# Patient Record
Sex: FEMALE | Race: OTHER
[De-identification: names, ages, dates, MRNs, and addresses within clinical notes are randomized per-mention and may not be internally consistent; named-entity substitution may affect disease eponyms.]

---

## 2017-10-29 NOTE — PCM.PREANE
Preanesthetic Assessment





- Procedure


Proposed Procedure: 





labor epidural





- Anesthesia/Transfusion/Family Hx


Anesthesia History: Prior Anesthesia Without Reaction (lasik, wisdom teeth)


Family History of Anesthesia Reaction: No





- Review of Systems


General: No Symptoms


Pulmonary: No Symptoms


Cardiovascular: No Symptoms


Gastrointestinal: No Symptoms


Neurological: Other (hx of lower back pain on right)


Other: Reports: None





- Physical Assessment


Pulse: 79


O2 Sat by Pulse Oximetry: 98


Respiratory Rate: 20


Blood Pressure: 112/73


Height: 1.57 m


Weight: 62.596 kg


ASA Class: 2


Mental Status: Alert & Oriented x3


Dentition: Reports: Normal Dentition


Thyro-Mental Finger Breadths: 3


Mouth Opening Finger Breadths: 4


Lungs: Clear to Auscultation, Normal Respiratory Effort


Cardiovascular: Regular Rate, Regular Rhythm





- Lab


Values: 





 Laboratory Last Values











WBC  6.85 K/uL (4.0-11.0)   10/29/17  12:09    


 


RBC  4.09 M/uL (4.30-5.90)  L  10/29/17  12:09    


 


Hgb  11.5 g/dL (12.0-16.0)  L  10/29/17  12:09    


 


Hct  34.6 % (36.0-46.0)  L  10/29/17  12:09    


 


MCV  84.6 fL (80.0-98.0)   10/29/17  12:09    


 


MCH  28.1 pg (27.0-32.0)   10/29/17  12:09    


 


MCHC  33.2 g/dL (31.0-37.0)   10/29/17  12:09    


 


RDW Std Deviation  41.4 fl (28.0-62.0)   10/29/17  12:09    


 


RDW Coeff of Marie  14 % (11.0-15.0)   10/29/17  12:09    


 


Plt Count  164 K/uL (150-400)   10/29/17  12:09    


 


MPV  10.10 fL (7.40-12.00)   10/29/17  12:09    


 


Nucleated RBC %  0.0 /100WBC  10/29/17  12:09    


 


Nucleated RBCs #  0 K/uL  10/29/17  12:09    


 


INR  0.94  (0.86-1.11)   10/29/17  12:09    


 


APTT  31.4 SEC (18.6-31.3)  H  10/29/17  12:09    


 


Fibrinogen  491 mg/dL (215-411)  H  10/29/17  12:09    


 


Blood Type  O POSITIVE   10/29/17  12:09    


 


Antibody Screen  NEGATIVE   10/29/17  12:09    














- Allergies


Allergies/Adverse Reactions: 


 Allergies











Allergy/AdvReac Type Severity Reaction Status Date / Time


 


No Known Allergies Allergy   Verified 01/12/15 15:05














- Blood


Blood Available: Yes


Product(s) Available: PRBC





- Anesthesia Plan


Pre-Op Medication Ordered: None





- Acknowledgements


Anesthesia Type Planned: Epidural


Pt an Appropriate Candidate for the Planned Anesthesia: Yes


Alternatives and Risks of Anesthesia Discussed w Pt/Guardian: Yes


Pt/Guardian Understands and Agrees with Anesthesia Plan: Yes





PreAnesthesia Questionnaire





- CURRENT (IN HOUSE) MEDS


Current Meds: 





 Current Medications





Butorphanol Tartrate (Stadol)  1 mg IVPUSH Q1H PRN


   PRN Reason: Pain


Carboprost Tromethamine (Hemabate Ds)  250 mcg IM ASDIRECTED PRN


   PRN Reason: Post Partum Hemorrhage


Lactated Ringer's (Ringers, Lactated)  1,000 mls @ 150 mls/hr IV ASDIRECTED PAULY


   Last Admin: 10/29/17 13:52 Dose:  999 mls/hr


Oxytocin/Sodium Chloride (Oxytocin 30 Unit/500 Ml-Ns)  30 unit in 500 mls @ 2 

mls/hr IV TITRATE PAULY; 2 MUNITS/MIN


   PRN Reason: Protocol


   Last Admin: 10/29/17 12:24 Dose:  2 munits/min, 2 mls/hr


Lidocaine HCl (Xylocaine 1%)  50 ml INJECT .ONCE PRN


   PRN Reason: Laceration repair


Methylergonovine Maleate (Methergine)  0.2 mg IM ASDIRECTED PRN


   PRN Reason: Post Partum Hemorrhage


Misoprostol (Cytotec)  200 mcg PO .ONCE PRN


   PRN Reason: Post Partum Hemorrhage


Nalbuphine HCl (Nubain)  10 mg IVPUSH Q1H PRN


   PRN Reason: Pain (severe 7-10)


Sodium Chloride (Saline Flush)  10 ml FLUSH ASDIRECTED PRN


   PRN Reason: Keep Vein Open


Sodium Chloride (Saline Flush)  2.5 ml FLUSH ASDIRECTED PRN


   PRN Reason: Keep Vein Open


Sterile Water (Sterile Water For Irrigation)  1,000 ml IRR ASDIRECTED PRN


   PRN Reason: delivery


Terbutaline Sulfate (Brethine)  0.25 mg SUBCUT ASDIRECTED PRN


   PRN Reason: Tacysystole





Discontinued Medications





Ephedrine Sulfate (Ephedrine Sulfate) Confirm Administered Dose 50 mg .ROUTE 

.STK-MED ONE


   Stop: 10/29/17 13:42


Fentanyl (Sublimaze) Confirm Administered Dose 300 mcg .ROUTE .STK-MED ONE


   Stop: 10/29/17 13:43


Ropivacaine (Naropin 0.2%) Confirm Administered Dose 100 mls @ as directed 

.ROUTE .STK-MED ONE


   Stop: 10/29/17 13:43


Ropivacaine (Naropin 0.2%) Confirm Administered Dose 20 ml .ROUTE .STK-MED ONE


   Stop: 10/29/17 13:43

## 2017-10-29 NOTE — PCM.PRNOTE
- Free Text/Narrative


Note: 





Called for a labor epidural for patient c/o labor pain. Patient identified and 

history reviewed. Discussed procedure and risks including bleeding, infection, 

nerve pain, nerve damage and unsuccessful epidural. Patient agrees. Sitting up, 

sterile betadine prep times 3 and sterile drape. 1% lidocaine SQ at L4 #25 

Touhy advanced  AI saline to approximately 5 cm. Catheter easily advanced to 

10cm. No heme no paresthesia. Test dose 3 ml 1.5% lidocaine with epinephrine 1:

200,000. negative reaction. Bolus of fentanyl 100 mcg given with 3 ml 0.2% 

ropivicaine. ropivicaine bolus x1 of 2ml. Total 5 ml bolus.Patient reports pain 

relief after 2 contractions. Level noted to be T10   Epidural drip of 0.2% 

ropivicaine and Fentanyl 2 mcg/ml started at 8 ml/hr PCEA bolus of 4 ml every 

20 minutes. Pt tolerated well pain score 7/10 to 2/10.

## 2017-10-30 NOTE — PCM.PNPP
- General Info


Date of Service: 10/30/17


Admission Dx/Problem (Free Text): 


33 yo P2 s/p  , PPD1 





Subjective Update: 


Patient seen at bedside , denies any complains . Normal lochia 





Functional Status: Reports: Pain Controlled, Tolerating Diet, Ambulating, 

Urinating





- Review of Systems


General: Reports: No Symptoms


HEENT: Reports: No Symptoms


Pulmonary: Reports: No Symptoms


Cardiovascular: Reports: No Symptoms


Gastrointestinal: Reports: No Symptoms


Genitourinary: Reports: No Symptoms


Musculoskeletal: Reports: No Symptoms


Skin: Reports: No Symptoms


Neurological: Reports: No Symptoms


Psychiatric: Reports: No Symptoms





- General Info


Date of Service: 10/30/17





- Patient Data


Vital Signs - Most Recent: 


 Last Vital Signs











Temp      


 


Pulse  79   10/29/17 14:27


 


Resp  20   10/29/17 14:27


 


BP  112/73   10/29/17 14:27


 


Pulse Ox  98   10/29/17 14:27











Weight - Most Recent: 62.596 kg


Lab Results - Last 24 Hours: 


 Laboratory Results - last 24 hr











  10/29/17 10/29/17 10/29/17 Range/Units





  12:09 12:09 12:09 


 


WBC  6.85    (4.0-11.0)  K/uL


 


RBC  4.09 L    (4.30-5.90)  M/uL


 


Hgb  11.5 L    (12.0-16.0)  g/dL


 


Hct  34.6 L    (36.0-46.0)  %


 


MCV  84.6    (80.0-98.0)  fL


 


MCH  28.1    (27.0-32.0)  pg


 


MCHC  33.2    (31.0-37.0)  g/dL


 


RDW Std Deviation  41.4    (28.0-62.0)  fl


 


RDW Coeff of Marie  14    (11.0-15.0)  %


 


Plt Count  164    (150-400)  K/uL


 


MPV  10.10    (7.40-12.00)  fL


 


Nucleated RBC %  0.0    /100WBC


 


Nucleated RBCs #  0    K/uL


 


INR    0.94  (0.86-1.11)  


 


APTT    31.4 H  (18.6-31.3)  SEC


 


Fibrinogen    491 H  (215-411)  mg/dL


 


Blood Type   O POSITIVE   


 


Antibody Screen   NEGATIVE   














  10/29/17 10/29/17 10/30/17 Range/Units





  18:56 18:56 06:16 


 


WBC  8.12    (4.0-11.0)  K/uL


 


RBC  3.81 L    (4.30-5.90)  M/uL


 


Hgb  10.7 L   10.3 L  (12.0-16.0)  g/dL


 


Hct  32.6 L   31.0 L  (36.0-46.0)  %


 


MCV  85.6    (80.0-98.0)  fL


 


MCH  28.1    (27.0-32.0)  pg


 


MCHC  32.8    (31.0-37.0)  g/dL


 


RDW Std Deviation  42.6    (28.0-62.0)  fl


 


RDW Coeff of Marie  14    (11.0-15.0)  %


 


Plt Count  138 L    (150-400)  K/uL


 


MPV  9.80    (7.40-12.00)  fL


 


Nucleated RBC %  0.0    /100WBC


 


Nucleated RBCs #  0    K/uL


 


INR   0.98   (0.86-1.11)  


 


APTT   32.7 H   (18.6-31.3)  SEC


 


Fibrinogen   454 H   (215-411)  mg/dL


 


Blood Type     


 


Antibody Screen     











Med Orders - Current: 


 Current Medications





Acetaminophen (Tylenol Extra Strength)  500 mg PO Q4H PRN


   PRN Reason: Pain


Acetaminophen (Tylenol Extra Strength)  1,000 mg PO Q4H PRN


   PRN Reason: Pain


Benzocaine/Menthol (Dermoplast Pain Relief 20%-0.5% Spray)  78 gm TOP 

ASDIRECTED PRN


   PRN Reason: Perineal Comfort Measure


Bisacodyl (Dulcolax)  10 mg RECTAL .ONCE PRN


   PRN Reason: Constipation


Carboprost Tromethamine (Hemabate Ds)  250 mcg IM ASDIRECTED PRN


   PRN Reason: Post Partum Hemorrhage


Docusate Sodium (Colace)  100 mg PO BID PRN


   PRN Reason: Constipation


Emollient Ointment (Lansinoh Hpa)  0 gm TOP ASDIRECTED PRN


   PRN Reason: Sore Nipples


Lactated Ringer's (Ringers, Lactated)  1,000 mls @ 150 mls/hr IV ASDIRECTED PAULY


   Last Admin: 10/29/17 13:52 Dose:  999 mls/hr


Oxytocin/Sodium Chloride (Oxytocin 30 Unit/500 Ml-Ns)  30 unit in 500 mls @ 2 

mls/hr IV TITRATE PAULY; 2 MUNITS/MIN


   PRN Reason: Protocol


   Last Titration: 10/29/17 18:00 Dose:  1 munits/min, 1 mls/hr


Ibuprofen (Motrin)  400 mg PO Q4H PRN


   PRN Reason: Pain


Ibuprofen (Motrin)  800 mg PO Q6H PRN


   PRN Reason: Pain


Lidocaine HCl (Xylocaine 1%)  50 ml INJECT .ONCE PRN


   PRN Reason: Laceration repair


Methylergonovine Maleate (Methergine)  0.2 mg IM ASDIRECTED PRN


   PRN Reason: Post Partum Hemorrhage


Misoprostol (Cytotec)  200 mcg PO .ONCE PRN


   PRN Reason: Post Partum Hemorrhage


Oxycodone HCl (Oxycodone)  5 mg PO Q2H PRN


   PRN Reason: Pain


Sodium Chloride (Saline Flush)  10 ml FLUSH ASDIRECTED PRN


   PRN Reason: Keep Vein Open


Sodium Chloride (Saline Flush)  2.5 ml FLUSH ASDIRECTED PRN


   PRN Reason: Keep Vein Open


Sterile Water (Sterile Water For Irrigation)  1,000 ml IRR ASDIRECTED PRN


   PRN Reason: delivery


Terbutaline Sulfate (Brethine)  0.25 mg SUBCUT ASDIRECTED PRN


   PRN Reason: Tacysystole


Witch Hazel (Tucks)  1 pad TOP ASDIRECTED PRN


   PRN Reason: comfort care





Discontinued Medications





Butorphanol Tartrate (Stadol)  1 mg IVPUSH Q1H PRN


   PRN Reason: Pain


Ephedrine Sulfate (Ephedrine Sulfate) Confirm Administered Dose 50 mg .ROUTE 

.STK-MED ONE


   Stop: 10/29/17 13:42


Fentanyl (Sublimaze) Confirm Administered Dose 300 mcg .ROUTE .STK-MED ONE


   Stop: 10/29/17 13:43


Ropivacaine (Naropin 0.2%) Confirm Administered Dose 100 mls @ as directed 

.ROUTE .STK-MED ONE


   Stop: 10/29/17 13:43


Nalbuphine HCl (Nubain)  10 mg IVPUSH Q1H PRN


   PRN Reason: Pain (severe 7-10)


Ropivacaine (Naropin 0.2%) Confirm Administered Dose 20 ml .ROUTE .STK-MED ONE


   Stop: 10/29/17 13:43











- Infant Interaction


Infant Disposition, Postpartum: [place] at Bedside


Infant Interaction: Holding Infant


Support Person: 





- Postpartum Recovery Exam


Fundal Tone: Firm


Fundal Level: At Umbilicus


Fundal Placement: Midline


Lochia Amount: Moderate


Lochia Color: Rubra/Red


Episiotomy/Laceration: Approximated


Bladder Status: Voiding


Urinary Elimination: Voided





- Exam


General: Alert


Lungs: Clear to Auscultation, Normal Respiratory Effort


Cardiovascular: Regular Rate, Regular Rhythm


GI/Abdominal Exam: Normal Bowel Sounds


Extremities: Normal Inspection


Psy/Mental Status: Alert





- Problem List & Annotations


(1) Vaginal delivery


SNOMED Code(s): 289742327


   Code(s): O80 - ENCOUNTER FOR FULL-TERM UNCOMPLICATED DELIVERY   Status: 

Acute   Current Visit: Yes   





- Problem List Review


Problem List Initiated/Reviewed/Updated: Yes





- My Orders


Last 24 Hours: 


My Active Orders





10/29/17 11:44


Patient Status [ADT] Routine 


Fetal Heart Tones [RC] CONTINUOUS 


Fetal Non Stress Test [RC] PER UNIT ROUTINE 


Notify Provider [RC] PRN 


Vaginal Exam [RC] PRN 


Vital Signs [RC] PER UNIT ROUTINE 


Carboprost Tromethamine [Hemabate DS]   250 mcg IM ASDIRECTED PRN 


Lidocaine 1% [Xylocaine 1%]   50 ml INJECT .ONCE PRN 


Methylergonovine [Methergine]   0.2 mg IM ASDIRECTED PRN 


Misoprostol [Cytotec]   200 mcg PO .ONCE PRN 


Sodium Chloride 0.9% [Saline Flush]   10 ml FLUSH ASDIRECTED PRN 


Sodium Chloride 0.9% [Saline Flush]   2.5 ml FLUSH ASDIRECTED PRN 


Water For Irrigation,Sterile [Sterile Water for Irrigation]   1,000 ml IRR 

ASDIRECTED PRN 


Peripheral IV Insertion Adult [OM.PC] Routine 


Resuscitation Status Routine 





10/29/17 11:45


Lactated Ringers [Ringers, Lactated] 1,000 ml IV ASDIRECTED 





10/29/17 11:49


Bedrest Bathroom Privileges [RC] ASDIRECTED 


Communication Order [RC] ASDIRECTED 


Communication Order [RC] ASDIRECTED 


Notify Provider [RC] PRN 


Oxygen Therapy [RC] ASDIRECTED 


Vaginal Exam [RC] PRN 


Vital Signs [RC] PER UNIT ROUTINE 


Terbutaline [Brethine]   0.25 mg SUBCUT ASDIRECTED PRN 





10/29/17 12:00


Oxytocin/0.9 % Sodium Chloride [Oxytocin 30 Unit/500 ML-NS] 30 unit in 500 ml 

IV TITRATE 


Medication Administration Instruction [OM.PC] Q3H 





10/29/17 20:14


Patient Status [ADT] Routine 


May Shower [RC] ASDIRECTED 


Up ad Avani [RC] ASDIRECTED 


Vital Signs [RC] PER UNIT ROUTINE 


Acetaminophen [Tylenol Extra Strength]   1,000 mg PO Q4H PRN 


Acetaminophen [Tylenol Extra Strength]   500 mg PO Q4H PRN 


Benzocaine/Menthol [Dermoplast Pain Relief 20%-0.5% Spray]   78 gm TOP 

ASDIRECTED PRN 


Bisacodyl [Dulcolax]   10 mg RECTAL .ONCE PRN 


Docusate Sodium [Colace]   100 mg PO BID PRN 


Ibuprofen [Motrin]   400 mg PO Q4H PRN 


Ibuprofen [Motrin]   800 mg PO Q6H PRN 


Lanolin [Lansinoh HPA]   See Dose Instructions  TOP ASDIRECTED PRN 


Witch Hazel [Tucks]   1 pad TOP ASDIRECTED PRN 


oxyCODONE   5 mg PO Q2H PRN 


Assess Lochia [WOMSER] Per Unit Routine 


Assess Uterine Involution [WOMSER] Per Unit Routine 


Peripheral IV Discontinue [OM.PC] Routine 














- Assessment


Assessment:: 





33 yo P2 s/p  , PPD1 , stable





- Plan


Plan:: 


Continue regular diet


Breastfeeding


Pain control 


Discharge home today

## 2017-10-30 NOTE — OR
SURGEON:

CARSON BAILEY

 

DATE OF PROCEDURE:  10/29/2017

 

PREOPERATIVE DIAGNOSES:

1. A 32-year-old  3, para 1-0-1-1, at 38 weeks and 6 days, admitted for

    induction of labor secondary to third trimester bleeding.

2. Group B Streptococcus is negative.

 

POSTOPERATIVE DIAGNOSES:

1. Status post spontaneous vaginal delivery.

2. Placental abruption.

3. Group B Streptococcus is negative.

 

ESTIMATED BLOOD LOSS:

300.

 

ANESTHESIA:

Epidural.

 

FINDINGS:

A live male  delivered at 1909, weight was 3,410 grams, Apgar score was 8

and 9.  Three-vessel cord was noted.  On inspection of the placenta, there was

about 5-10% abruption.  A second-degree laceration was repaired also.

 

HISTORY:

The patient is a 32-year-old, G3, P1-0-1-1 at 38 weeks and 6 days, who presented

to the triage complaining of vaginal spotting and bleeding that soaked a pad.

The patien , reported decreased  fetal movement.  As a result, the patient

was examined and found to have some mild spotting in the posterior vaginal

fornix. The Fetal heart tones was Category 1. The patient was examined and 
found to be 2 cm dilated.  As a result,

patient was kept for induction of labor, and serial coags were done.  The

patient had the Frank bulb placed.  After the Frank bulb, patient was 3 cm

dilated.  The patient was then reevaluated and found to be 5 cm dilated, 50%

effaced, and -2 station.  AROM was done, which showed bloody

fluid.  Subsequently, the patient became fully dilated.

 

PROCEDURE IN DETAIL:

When patient was fully dilated, the patient was encouraged to push.  The patient

had good pushing effort, and delivered the head, followed by the anterior

shoulder and then the posterior shoulder, followed by the body.  The infant was

then placed on the abdomen of the mother and suctioned cleaned.  The cord was

clamped x2, and the placenta was then delivered, and a gush of blood was noted

with the delivery of the placenta.  The uterus was then massaged.

The perineum was inspected and assessed to have a second-degree laceration,

which was sutured with 2-0 Caprosyn.  The perineum was then inspected again and

found to be hemostatic.The instrument and pad counts were correct x2.

 



 

 

LUIS CARLOS BALL

DD:  10/30/2017 06:20:39

DT:  10/30/2017 07:16:55

Job #:  [id number]

IJEOMA

## 2017-12-10 NOTE — EDM.PDOC
ED HPI GENERAL MEDICAL PROBLEM





- General


Chief Complaint: Abdominal Pain


Stated Complaint: ABD PAIN


Time Seen by Provider: 12/10/17 07:15


Source of Information: Reports: Patient


History Limitations: Reports: No Limitations





- History of Present Illness


INITIAL COMMENTS - FREE TEXT/NARRATIVE: 


History of present illness:


[]Patient ate a large meal last night at 7 PM and then drinks soda which she 

normally doesn't and shortly experienced right-sided abdominal pain. Pain 

radiating around her right upper flank and was cramping. She was able to sleep 

last night but awoke early to feed her  baby and the pain began again. 

She denies any vomiting but feels nauseated.





Review of systems: 


As per history of present illness and below otherwise all systems reviewed and 

negative.





Past medical history: 


As per history of present illness and as reviewed below otherwise 

noncontributory.





Surgical history: 


As per history of present illness and as reviewed below otherwise 

noncontributory.





Social history: 


No reported history of drug or alcohol abuse.





Family history: 


As per history of present illness and as reviewed below otherwise 

noncontributory.





Physical exam:


General: Well developed, well nourished in NAD


HEENT: Atraumatic, normocephalic, pupils reactive, negative for conjunctival 

pallor or scleral icterus, mucous membranes moist, throat clear, neck supple, 

nontender, trachea midline.


Lungs: Clear to auscultation, breath sounds equal bilaterally, chest nontender.


Heart: S1S2, regular, negative for clicks, rubs, or JVD.


Abdomen: Soft, nondistended, nontender. Negative for masses or 

hepatosplenomegaly. Negative for costovertebral tenderness.


Pelvis: Stable nontender.


Genitourinary: Deferred.


Rectal: Deferred.


Extremities: Atraumatic, negative for cords or calf pain. Neurovascular 

unremarkable.


Neuro: Awake, alert, oriented. Cranial nerves II through XII unremarkable. 

Cerebellum unremarkable. Motor and sensory unremarkable throughout. Exam 

nonfocal.





Diagnostics:


[]Labs normal ultrasound gallbladder shows cholelithiasis no signs of 

cholecystitis 





Therapeutics:


[]IV hydration and morphine Zofran for pain and nausea





Impression: 


[]Cholelithiasis





Plan:


[]Follow-up with general surgeon, [place] and Zofran for pain and nausea. Return 

if symptoms worsen or change.





Definitive disposition and diagnosis as appropriate pending reevaluation and 

review of above.





  ** Right Lower Abdomen


Pain Score (Numeric/FACES): 10





- Related Data


 Allergies











Allergy/AdvReac Type Severity Reaction Status Date / Time


 


No Known Allergies Allergy   Verified 12/10/17 07:11











Home Meds: 


 Home Meds





. [No Known Home Meds]  12/10/17 [History]











Past Medical History


OB/GYN History: Reports: Pregnancy, Spontaneous 





- Infectious Disease History


Infectious Disease History: Reports: Chicken Pox, Measles





- Past Surgical History


HEENT Surgical History: Reports: LASIK, Other (See Below)


Other HEENT Surgeries/Procedures: wisdom teeth extraction





Social & Family History





- Family History


Cardiac: Reports: Hypertension


Respiratory: Reports: Asthma


: Reports: Renal Calculus


Neurological: Reports: CVA


Endocrine/Metabolic: Reports: Diabetes, Type I


Oncologic: Reports: Breast





- Tobacco Use


Smoking Status *Q: Never Smoker


Second Hand Smoke Exposure: No





- Caffeine Use


Caffeine Use: Reports: Soda


Other Caffeine Use: occasional


Caffeine Use Comment: rare





- Recreational Drug Use


Recreational Drug Use: No





ED ROS GENERAL





- Review of Systems


Review Of Systems: See Below (See history of present illness)





ED EXAM, GI/ABD





- Physical Exam


Exam: See Below (See history of present illness)





Course





- Vital Signs


Last Recorded V/S: 


 Last Vital Signs











Temp  97.3 F   12/10/17 07:07


 


Pulse  74   12/10/17 08:44


 


Resp  16   12/10/17 08:44


 


BP  114/64   12/10/17 08:44


 


Pulse Ox  97   12/10/17 08:44














- Orders/Labs/Meds


Orders: 


 Active Orders 24 hr











 Category Date Time Status


 


 Abdomen Ltd [US] Stat Exams  12/10/17 08:31 Taken


 


 Sodium Chloride 0.9% [Saline Flush] Med  12/10/17 07:22 Active





 10 ml FLUSH ASDIRECTED PRN   


 


 Sodium Chloride 0.9% [Saline Flush] Med  12/10/17 07:22 Active





 2.5 ml FLUSH ASDIRECTED PRN   


 


 Saline Lock Insert [OM.PC] Stat Oth  12/10/17 07:21 Ordered








 Medication Orders





Sodium Chloride (Saline Flush)  10 ml FLUSH ASDIRECTED PRN


   PRN Reason: Keep Vein Open


Sodium Chloride (Saline Flush)  2.5 ml FLUSH ASDIRECTED PRN


   PRN Reason: Keep Vein Open








Labs: 


 Laboratory Tests











  12/10/17 12/10/17 12/10/17 Range/Units





  07:30 07:30 07:45 


 


WBC    7.85  (4.0-11.0)  K/uL


 


RBC    5.13  (4.30-5.90)  M/uL


 


Hgb    14.2  (12.0-16.0)  g/dL


 


Hct    42.0  (36.0-46.0)  %


 


MCV    81.9  (80.0-98.0)  fL


 


MCH    27.7  (27.0-32.0)  pg


 


MCHC    33.8  (31.0-37.0)  g/dL


 


RDW Std Deviation    42.0  (28.0-62.0)  fl


 


RDW Coeff of Marie    14  (11.0-15.0)  %


 


Plt Count    168  (150-400)  K/uL


 


MPV    10.00  (7.40-12.00)  fL


 


Neut % (Auto)    65.8  (48.0-80.0)  %


 


Lymph % (Auto)    27.0  (16.0-40.0)  %


 


Mono % (Auto)    6.0  (0.0-15.0)  %


 


Eos % (Auto)    1.1  (0.0-7.0)  %


 


Baso % (Auto)    0.1  (0.0-1.5)  %


 


Neut # (Auto)    5.2  (1.4-5.7)  K/uL


 


Lymph # (Auto)    2.1  (0.6-2.4)  K/uL


 


Mono # (Auto)    0.5  (0.0-0.8)  K/uL


 


Eos # (Auto)    0.1  (0.0-0.7)  K/uL


 


Baso # (Auto)    0.0  (0.0-0.1)  K/uL


 


Nucleated RBC %    0.0  /100WBC


 


Nucleated RBCs #    0  K/uL


 


Sodium     (136-146)  mmol/L


 


Potassium     (3.5-5.1)  mmol/L


 


Chloride     ()  mmol/L


 


Carbon Dioxide     (21-31)  mmol/L


 


BUN     (6.0-23.0)  mg/dL


 


Creatinine     (0.6-1.5)  mg/dL


 


Est Cr Clr Drug Dosing     mL/min


 


Estimated GFR (MDRD)     ml/min


 


Glucose     ()  mg/dL


 


Calcium     (8.8-10.8)  mg/dL


 


Total Bilirubin     (0.1-1.5)  mg/dL


 


AST     (5-40)  IU/L


 


ALT     (8-54)  IU/L


 


Alkaline Phosphatase     ()  


 


Total Protein     (6.0-8.0)  g/dL


 


Albumin     (3.5-5.0)  g/dL


 


Globulin     (2.0-3.5)  g/dL


 


Albumin/Globulin Ratio     (1.3-2.8)  


 


Lipase     (7-80)  U/L


 


Urine Color   YELLOW   


 


Urine Appearance   SLT CLOUDY   


 


Urine pH   7.0   (5.0-8.0)  


 


Ur Specific Gravity   1.015   (1.001-1.035)  


 


Urine Protein   NEGATIVE   (NEGATIVE)  mg/dL


 


Urine Glucose (UA)   NEGATIVE   (NEGATIVE)  mg/dL


 


Urine Ketones   NEGATIVE   (NEGATIVE)  mg/dL


 


Urine Occult Blood   TRACE-INTACT   (NEGATIVE)  


 


Urine Nitrite   NEGATIVE   (NEGATIVE)  


 


Urine Bilirubin   NEGATIVE   (NEGATIVE)  


 


Urine Urobilinogen   0.2   (<2.0)  EU/dL


 


Ur Leukocyte Esterase   TRACE   (NEGATIVE)  


 


Urine RBC   0-3   (0-2/HPF)  


 


Urine WBC   0-3   (0-5/HPF)  


 


Ur Epithelial Cells   OCCASIONAL   (NONE-FEW)  


 


Urine Bacteria   FEW   (NEGATIVE)  


 


Urine HCG, Qual  NEGATIVE    (NEGATIVE)  














  12/10/17 Range/Units





  07:45 


 


WBC   (4.0-11.0)  K/uL


 


RBC   (4.30-5.90)  M/uL


 


Hgb   (12.0-16.0)  g/dL


 


Hct   (36.0-46.0)  %


 


MCV   (80.0-98.0)  fL


 


MCH   (27.0-32.0)  pg


 


MCHC   (31.0-37.0)  g/dL


 


RDW Std Deviation   (28.0-62.0)  fl


 


RDW Coeff of Marie   (11.0-15.0)  %


 


Plt Count   (150-400)  K/uL


 


MPV   (7.40-12.00)  fL


 


Neut % (Auto)   (48.0-80.0)  %


 


Lymph % (Auto)   (16.0-40.0)  %


 


Mono % (Auto)   (0.0-15.0)  %


 


Eos % (Auto)   (0.0-7.0)  %


 


Baso % (Auto)   (0.0-1.5)  %


 


Neut # (Auto)   (1.4-5.7)  K/uL


 


Lymph # (Auto)   (0.6-2.4)  K/uL


 


Mono # (Auto)   (0.0-0.8)  K/uL


 


Eos # (Auto)   (0.0-0.7)  K/uL


 


Baso # (Auto)   (0.0-0.1)  K/uL


 


Nucleated RBC %   /100WBC


 


Nucleated RBCs #   K/uL


 


Sodium  137  (136-146)  mmol/L


 


Potassium  4.0  (3.5-5.1)  mmol/L


 


Chloride  104  ()  mmol/L


 


Carbon Dioxide  21  (21-31)  mmol/L


 


BUN  11  (6.0-23.0)  mg/dL


 


Creatinine  0.7  (0.6-1.5)  mg/dL


 


Est Cr Clr Drug Dosing  92.94  mL/min


 


Estimated GFR (MDRD)  > 60.0  ml/min


 


Glucose  129 H  ()  mg/dL


 


Calcium  9.9  (8.8-10.8)  mg/dL


 


Total Bilirubin  0.4  (0.1-1.5)  mg/dL


 


AST  17  (5-40)  IU/L


 


ALT  18  (8-54)  IU/L


 


Alkaline Phosphatase  103  ()  


 


Total Protein  8.3 H  (6.0-8.0)  g/dL


 


Albumin  4.6  (3.5-5.0)  g/dL


 


Globulin  3.7 H  (2.0-3.5)  g/dL


 


Albumin/Globulin Ratio  1.2 L  (1.3-2.8)  


 


Lipase  24  (7-80)  U/L


 


Urine Color   


 


Urine Appearance   


 


Urine pH   (5.0-8.0)  


 


Ur Specific Gravity   (1.001-1.035)  


 


Urine Protein   (NEGATIVE)  mg/dL


 


Urine Glucose (UA)   (NEGATIVE)  mg/dL


 


Urine Ketones   (NEGATIVE)  mg/dL


 


Urine Occult Blood   (NEGATIVE)  


 


Urine Nitrite   (NEGATIVE)  


 


Urine Bilirubin   (NEGATIVE)  


 


Urine Urobilinogen   (<2.0)  EU/dL


 


Ur Leukocyte Esterase   (NEGATIVE)  


 


Urine RBC   (0-2/HPF)  


 


Urine WBC   (0-5/HPF)  


 


Ur Epithelial Cells   (NONE-FEW)  


 


Urine Bacteria   (NEGATIVE)  


 


Urine HCG, Qual   (NEGATIVE)  











Meds: 


Medications











Generic Name Dose Route Start Last Admin





  Trade Name Freq  PRN Reason Stop Dose Admin


 


Sodium Chloride  10 ml  12/10/17 07:22  





  Saline Flush  FLUSH   





  ASDIRECTED PRN   





  Keep Vein Open   


 


Sodium Chloride  2.5 ml  12/10/17 07:22  





  Saline Flush  FLUSH   





  ASDIRECTED PRN   





  Keep Vein Open   














Discontinued Medications














Generic Name Dose Route Start Last Admin





  Trade Name Yen  PRN Reason Stop Dose Admin


 


Sodium Chloride  1,000 mls @ 999 mls/hr  12/10/17 07:22  12/10/17 07:51





  Normal Saline  IV  12/10/17 08:22  999 mls/hr





  .Bolus ONE   Administration


 


Morphine Sulfate  2 mg  12/10/17 07:22  12/10/17 07:51





  Morphine  IVPUSH  12/10/17 07:23  2 mg





  ONETIME ONE   Administration


 


Ondansetron HCl  4 mg  12/10/17 07:22  12/10/17 07:51





  Zofran  IVPUSH  12/10/17 07:23  4 mg





  ONETIME ONE   Administration














Departure





- Departure


Time of Disposition: 09:39


Disposition: Home, Self-Care 01


Condition: Good


Clinical Impression: 


Cholelithiasis


Qualifiers:


 Cholelithiasis location: gallbladder Cholecystitis presence: without 

cholecystitis Biliary obstruction: without biliary obstruction Qualified Code(s)

: K80.20 - Calculus of gallbladder without cholecystitis without obstruction








- Discharge Information


Referrals: 


Osmin Crocker [Primary Care Provider] - 


Forms:  ED Department Discharge


Additional Instructions: 


The following information is given to patients seen in the emergency department 

who are being discharged to home. This information is to outline your options 

for follow-up care. We provide all patients seen in our emergency department 

with a follow-up referral.





The need for follow-up, as well as the timing and circumstances, are variable 

depending upon the specifics of your emergency department visit.





If you don't have a primary care physician on staff, we will provide you with a 

referral. We always advise you to contact your personal physician following an 

emergency department visit to inform them of the circumstance of the visit and 

for follow-up with them and/or the need for any referrals to a consulting 

specialist.





The emergency department will also refer you to a specialist when appropriate. 

This referral assures that you have the opportunity for follow-up care with a 

specialist. All of these measure are taken in an effort to provide you with 

optimal care, which includes your follow-up.





Under all circumstances we always encourage you to contact your private 

physician who remains a resource for coordinating your care. When calling for 

follow-up care, please make the office aware that this follow-up is from your 

recent emergency room visit. If for any reason you are refused follow-up, 

please contact the [facility] Emergency 

Department at [phone] and asked to speak to the emergency department 

charge nurse.





[place], Zofran for nausea follow-up with general surgery. Return if fevers occur

, symptoms worsen or change











- My Orders


Last 24 Hours: 


My Active Orders





12/10/17 07:21


Saline Lock Insert [OM.PC] Stat 





12/10/17 07:22


Sodium Chloride 0.9% [Saline Flush]   10 ml FLUSH ASDIRECTED PRN 


Sodium Chloride 0.9% [Saline Flush]   2.5 ml FLUSH ASDIRECTED PRN 





12/10/17 08:31


Abdomen Ltd [US] Stat 














- Assessment/Plan


Last 24 Hours: 


My Active Orders





12/10/17 07:21


Saline Lock Insert [OM.PC] Stat 





12/10/17 07:22


Sodium Chloride 0.9% [Saline Flush]   10 ml FLUSH ASDIRECTED PRN 


Sodium Chloride 0.9% [Saline Flush]   2.5 ml FLUSH ASDIRECTED PRN 





12/10/17 08:31


Abdomen Ltd [US] Stat

## 2017-12-11 NOTE — US
EXAM DATE: 12/10/17



PATIENT'S AGE: 32



Patient: HERMELINDO PEREZ



Facility: [place], [place]

Patient ID: [id number]

Site Patient ID: [id number].

Site Accession #: [id number].

: 1985

Study: US Abdomen [id number]/10/2017 9:24:33 AM

Ordering Physician: Kyle Mays



Final Report: 

INDICATION:

Right upper quadrant pain.



Technique:

Abdominal limited ultrasound.



Findings:

Pancreas is more hypoechoic than typical which is likely normal variant. 
Pancreas otherwise normal. Multiple small stones in the dependent aspect of a 
moderately distended gallbladder. Gallbladder wall normal. No sonographic 
Eubanks sign. No pericholecystic fluid. Common bile duct measures 1.1 mm which 
is normal. Right kidney measures 10.0 cm and is negative for hydronephrosis. 
Liver is negative for mass or bile duct dilatation. Remainder negative.



Impression:

Cholelithiasis. Moderately distended gallbladder. No sonographic evidence of 
cholecystitis or biliary dilatation. Other findings as above.





Dictated by Chester Santillan MD @ Dec 10 2017 9:30AM

(Electronic Signature)



Report Signed by Proxy.
[email or web address]

## 2020-10-29 ENCOUNTER — HOSPITAL ENCOUNTER (INPATIENT)
Dept: HOSPITAL 56 - MW.OBCHECK | Age: 35
LOS: 1 days | Discharge: HOME | DRG: 560 | End: 2020-10-30
Attending: OBSTETRICS & GYNECOLOGY | Admitting: OBSTETRICS & GYNECOLOGY
Payer: COMMERCIAL

## 2020-10-29 DIAGNOSIS — Z3A.39: ICD-10-CM

## 2020-10-29 DIAGNOSIS — Z20.828: ICD-10-CM

## 2020-10-29 PROCEDURE — 3E0R3BZ INTRODUCTION OF ANESTHETIC AGENT INTO SPINAL CANAL, PERCUTANEOUS APPROACH: ICD-10-PCS | Performed by: OBSTETRICS & GYNECOLOGY

## 2020-10-29 PROCEDURE — U0002 COVID-19 LAB TEST NON-CDC: HCPCS

## 2020-10-29 PROCEDURE — 00HU33Z INSERTION OF INFUSION DEVICE INTO SPINAL CANAL, PERCUTANEOUS APPROACH: ICD-10-PCS | Performed by: OBSTETRICS & GYNECOLOGY

## 2020-10-29 NOTE — PCM.PREANE
Preanesthetic Assessment





- Anesthesia/Transfusion/Family Hx


Anesthesia History: Prior Anesthesia Without Reaction


Transfusion History: No Prior Transfusion(s)





- Review of Systems


General: No Symptoms


Pulmonary: Other (symptomatic from seasonal allergies)


Cardiovascular: No Symptoms


Gastrointestinal: No Symptoms


Neurological: No Symptoms


Other: Reports: None





- Physical Assessment


NPO Status Date: 10/29/20


NPO Status Time: 08:55


Height: 1.57 m


Weight: 63.503 kg


ASA Class: 2


Mental Status: Alert & Oriented x3


Airway Class: Mallampati = 2


Dentition: Reports: Normal Dentition


Thyro-Mental Finger Breadths: 3


Mouth Opening Finger Breadths: 3


ROM/Head Extension: Full


Lungs: Clear to Auscultation, Normal Respiratory Effort


Cardiovascular: Regular Rate, Regular Rhythm





- Lab


Values: 





                             Laboratory Last Values











WBC  7.82 K/uL (4.0-11.0)   10/29/20  06:25    


 


RBC  4.53 M/uL (4.30-5.90)   10/29/20  06:25    


 


Hgb  13.2 g/dL (12.0-16.0)   10/29/20  06:25    


 


Hct  39.6 % (36.0-46.0)   10/29/20  06:25    


 


MCV  87.4 fL (80.0-98.0)   10/29/20  06:25    


 


MCH  29.1 pg (27.0-32.0)   10/29/20  06:25    


 


MCHC  33.3 g/dL (31.0-37.0)   10/29/20  06:25    


 


RDW Std Deviation  66.4 fl (28.0-62.0)  H  10/29/20  06:25    


 


RDW Coeff of Marie  21 % (11.0-15.0)  H  10/29/20  06:25    


 


Plt Count  150 K/uL (150-400)   10/29/20  06:25    


 


MPV  10.30 fL (7.40-12.00)   10/29/20  06:25    


 


Nucleated RBC %  0.0 /100WBC  10/29/20  06:25    


 


Nucleated RBCs #  0 K/uL  10/29/20  06:25    


 


SARS-CoV-2 RNA (ARTEMIO)  NEGATIVE  (NEGATIVE)   10/29/20  06:35    


 


Blood Type  O POSITIVE   10/29/20  06:25    


 


Antibody Screen  NEGATIVE   10/29/20  06:25    














- Allergies


Allergies/Adverse Reactions: 


                                    Allergies











Allergy/AdvReac Type Severity Reaction Status Date / Time


 


grape Allergy  Airway Verified 10/29/20 08:04





   Tightness  


 


watermelon Allergy  Airway Verified 10/29/20 08:04





   Tightness  














- Acknowledgements


Anesthesia Type Planned: Epidural (The patient understands and accepts the 

anesthetic risks and benefits of epidural.  All questions answered.  She agrees 

to proceed. consent signed.  )


Pt an Appropriate Candidate for the Planned Anesthesia: Yes


Alternatives and Risks of Anesthesia Discussed w Pt/Guardian: Yes


Pt/Guardian Understands and Agrees with Anesthesia Plan: Yes





PreAnesthesia Questionnaire


HEENT History: Reports: Other (See Below) (seasonal allergies)


Cardiovascular History: Reports: None


Respiratory History: Reports: Other (See Below) (symptomatic from seasonal 

allergies.)


Gastrointestinal History: Reports: None


Genitourinary History: Reports: None


OB/GYN History: Reports: Pregnancy, Spontaneous 


Musculoskeletal History: Reports: None


Neurological History: Reports: None


Psychiatric History: Reports: None


Endocrine/Metabolic History: Reports: None


Hematologic History: Reports: None





- Infectious Disease History


Infectious Disease History: Reports: Chicken Pox





- Past Surgical History


HEENT Surgical History: Reports: LASIK, Other (See Below)


Other HEENT Surgeries/Procedures: wisdom teeth extraction


Cardiovascular Surgical History: Reports: None





- SUBSTANCE USE


Tobacco Use Status *Q: Never Tobacco User


Second Hand Smoke Exposure: No


Recreational Drug Use History: No





- HOME MEDS


Home Medications: 


                                    Home Meds





Pnv No.95/Ferrous Fum/Folic AC [Prenatal Tablet] 1 each PO 10/29/20 [History]











- CURRENT (IN HOUSE) MEDS


Current Meds: 





                               Current Medications





Butorphanol Tartrate (Stadol)  1 mg IVPUSH Q1H PRN


   PRN Reason: Pain


Carboprost Tromethamine (Hemabate Ds)  250 mcg IM ASDIRECTED PRN


   PRN Reason: Post Partum Hemorrhage


Oxytocin/Sodium Chloride (Oxytocin 30 Unit/500 Ml-Ns)  30 unit in 500 mls @ 999 

mls/hr IV TITRATE PAULY


Tranexamic Acid 1,000 mg/ (Sodium Chloride)  110 mls @ 660 mls/hr IV ONETIME PRN


   PRN Reason: Bleeding


Lactated Ringer's (Ringers, Lactated)  1,000 mls @ 150 mls/hr IV ASDIRECTED PAULY


   Last Infusion: 10/29/20 08:08 Dose:  150 mls/hr


   Documented by: 


Lidocaine HCl (Xylocaine 1%)  50 ml INJECT ONETIME PRN


   PRN Reason: Laceration repair


Methylergonovine Maleate (Methergine)  0.2 mg IM ASDIRECTED PRN


   PRN Reason: Post Partum Hemorrhage


Misoprostol (Cytotec)  200 mcg PO ONETIME PRN


   PRN Reason: Post Partum Hemorrhage


Nalbuphine HCl (Nubain)  10 mg IVPUSH Q1H PRN


   PRN Reason: Pain (severe 7-10)


Sodium Chloride (Saline Flush)  10 ml FLUSH ASDIRECTED PRN


   PRN Reason: Keep Vein Open


Sodium Chloride (Saline Flush)  2.5 ml FLUSH ASDIRECTED PRN


   PRN Reason: Keep Vein Open


Sodium Chloride (Normal Saline)  10 ml IV ASDIRECTED PRN


   PRN Reason: IV Use


Sterile Water (Sterile Water For Irrigation)  1,000 ml IRR ASDIRECTED PRN


   PRN Reason: delivery





Discontinued Medications





Ropivacaine (Naropin 0.2%) Confirm Administered Dose 100 mls @ as directed 

.ROUTE .[facility]


   Stop: 10/29/20 07:53

## 2020-10-29 NOTE — PCM.SN.2
- Free Text/Narrative


Note: 


Consent done.  0907 Time out done, 0907 patient positioned.  Epidural done with 

sterile technique.  Patient in sitting position.  Back prepped with 

chlorhexidine, and draped.  1% lidocaine for skin infiltration, 3ml at l3-l4.  

17 gauge touhy  tatyana with air at 6 cm, catheter threaded at 14 cm.  no 

paresthesias or pain.  catheter aspiration negative for heme or csf.   

difficulty giving test dose, suspect equipment malfunction.  2 attempts. by me, 

called Dr. Salvador for assistance.  Dr. Salvador arrived at 0940. replaced cap, 

pulled epidural catheter back and several times and unable to push local 

anesthetic.  epidural catheter removed with intact tip, and replaced.  17 gauge 

touhy tatyana with air at 6 cm, catheter secured at 14 cm.  Test dose 0955 negative 

with 1.5% lidocaine 3 ml, 0958 catheter secured.  


1004 0.2% Ropivacaine 6ml loading dose given.  patient tolerated procedure well,

and maintained communication throughout the entire procedure.

## 2020-10-29 NOTE — PCM.DEL
L & D Note





- General Info


Date of Service: 10/29/20





- Delivery Note


Labor: Spontaneous


Delivery Outcome: Livebirth


Infant Delivery Method: Spontaneous Vaginal Delivery-Single


Birth Presentation: Right Occiput Anterior (SALLY)


Nuchal Cord: Present


Anesthesia Type: Epidural


Amniotic Fluid Description: Clear


Episiotomy Type: None


Laceration: None


Placenta: Intact


Estimated Blood Loss: 300


Resuscitation Needed: No


APGAR Score 1 min: 7


APGAR Score 5 min: 9


Delivery Comments (Free Text/Narrative):: 


Live female  delivered at 1127 , apgar 7/9 weight 3360g








- General Info


Date of Service: 10/29/20





- Patient Data


Weight - Most Recent: 63.503 kg


Lab Results Last 24 Hours: 


                         Laboratory Results - last 24 hr











  10/29/20 10/29/20 10/29/20 Range/Units





  06:25 06:25 06:35 


 


WBC  7.82    (4.0-11.0)  K/uL


 


RBC  4.53    (4.30-5.90)  M/uL


 


Hgb  13.2    (12.0-16.0)  g/dL


 


Hct  39.6    (36.0-46.0)  %


 


MCV  87.4    (80.0-98.0)  fL


 


MCH  29.1    (27.0-32.0)  pg


 


MCHC  33.3    (31.0-37.0)  g/dL


 


RDW Std Deviation  66.4 H    (28.0-62.0)  fl


 


RDW Coeff of Amrie  21 H    (11.0-15.0)  %


 


Plt Count  150    (150-400)  K/uL


 


MPV  10.30    (7.40-12.00)  fL


 


Nucleated RBC %  0.0    /100WBC


 


Nucleated RBCs #  0    K/uL


 


SARS-CoV-2 RNA (ARTEMIO)    NEGATIVE  (NEGATIVE)  


 


Blood Type   O POSITIVE   


 


Antibody Screen   NEGATIVE   











Med Orders - Current: 


                               Current Medications





Butorphanol Tartrate (Stadol)  1 mg IVPUSH Q1H PRN


   PRN Reason: Pain


Carboprost Tromethamine (Hemabate Ds)  250 mcg IM ASDIRECTED PRN


   PRN Reason: Post Partum Hemorrhage


Oxytocin/Sodium Chloride (Oxytocin 30 Unit/500 Ml-Ns)  30 unit in 500 mls @ 999 

mls/hr IV TITRATE [facility]UL[facility]


Tranexamic Acid 1,000 mg/ (Sodium Chloride)  110 mls @ 660 mls/hr IV ONETIME PRN


   PRN Reason: Bleeding


Lactated Ringer's (Ringers, Lactated)  1,000 mls @ 150 mls/hr IV ASDIRECTED PAULY


   Last Infusion: 10/29/20 08:08 Dose:  150 mls/hr


   Documented by: 


Lidocaine HCl (Xylocaine 1%)  50 ml INJECT ONETIME PRN


   PRN Reason: Laceration repair


Methylergonovine Maleate (Methergine)  0.2 mg IM ASDIRECTED PRN


   PRN Reason: Post Partum Hemorrhage


Misoprostol (Cytotec)  200 mcg PO ONETIME PRN


   PRN Reason: Post Partum Hemorrhage


Nalbuphine HCl (Nubain)  10 mg IVPUSH Q1H PRN


   PRN Reason: Pain (severe 7-10)


Sodium Chloride (Saline Flush)  10 ml FLUSH ASDIRECTED PRN


   PRN Reason: Keep Vein Open


Sodium Chloride (Saline Flush)  2.5 ml FLUSH ASDIRECTED PRN


   PRN Reason: Keep Vein Open


Sodium Chloride (Normal Saline)  10 ml IV ASDIRECTED PRN


   PRN Reason: IV Use


Sterile Water (Sterile Water For Irrigation)  1,000 ml IRR ASDIRECTED PRN


   PRN Reason: delivery





Discontinued Medications





Ropivacaine (Naropin 0.2%) Confirm Administered Dose 100 mls @ as directed 

.ROUTE .STK-MED ONE


   Stop: 10/29/20 07:53


   Last Admin: 10/29/20 11:03 Dose:  Not Given


   Documented by: 











- Problem List & Annotations


(1) Vaginal delivery


SNOMED Code(s): 255778632


   Code(s): O80 - ENCOUNTER FOR FULL-TERM UNCOMPLICATED DELIVERY   Status: Acute

  





- Problem List Review


Problem List Initiated/Reviewed/Updated: Yes





- My Orders


Last 24 Hours: 


My Active Orders





10/29/20 11:58


Acetaminophen [Tylenol Extra Strength]   1,000 mg PO Q4H PRN 


Acetaminophen [Tylenol Extra Strength]   500 mg PO Q4H PRN 


Benzocaine/Menthol [Dermoplast Pain Relief 20%-0.5% Spray]   78 gm TOP 

ASDIRECTED PRN 


Docusate Sodium [Colace]   100 mg PO BID PRN 


Ibuprofen [Motrin]   400 mg PO Q4H PRN 


Ibuprofen [Motrin]   800 mg PO Q6H PRN 


Lanolin [Lansinoh HPA]   See Dose Instructions  TOP ASDIRECTED PRN 


bisacodyL [Dulcolax]   10 mg RECTAL ONETIME PRN 


oxyCODONE   5 mg PO Q2H PRN 


witch hazeL [Tucks]   1 pad TOP ASDIRECTED PRN 


Resuscitation Status Routine 





10/29/20 11:59


Patient Status [ADT] Routine 


May Shower [RC] ASDIRECTED 


Up ad Avani [RC] ASDIRECTED 


Vital Signs [RC] PER UNIT ROUTINE 


Assess Lochia [WOMSER] Per Unit Routine 


Assess Uterine Involution [WOMSER] Per Unit Routine 


Peripheral IV Discontinue [OM.PC] Routine 





10/30/20 05:11


HEMOGLOBIN/HEMATOCRIT,HH [HEME] Timed

## 2020-10-30 VITALS — HEART RATE: 81 BPM | DIASTOLIC BLOOD PRESSURE: 67 MMHG | SYSTOLIC BLOOD PRESSURE: 115 MMHG

## 2020-10-30 NOTE — OR
SURGEON:

CARSON BAILEY

 

DATE OF PROCEDURE:  10/29/2020

 

PREOPERATIVE DIAGNOSES:

A 35-year-old, G5, P2-0-2-2 at 39 weeks and 1 day, admitted in active labor.

 

POSTOPERATIVE DIAGNOSES:

A 35-year-old, G5, P2-0-2-2 at 39 weeks and 1 day, admitted in active labor.

 

PROCEDURE:

Normal spontaneous vaginal delivery.

 

ANESTHESIA:

Epidural.

 

ESTIMATED BLOOD LOSS:

300.

 

INTRAVENOUS FLUIDS:

Pitocin running.

 

NOTES AND FINDINGS:

A live female  delivered at 11:27, Apgar scores are 7 and 9, weight is

pending.

 

BRIEF HISTORY ABOUT THE PATIENT:

She is a 35-year-old G5, P2-0-2-2 at 39 weeks and 1 day; came in complaining of

contractions.  She was 7 cm dilated.  She was requesting epidural, which she got

after which SROM'd on her own.  She became fully dilated.

 

DESCRIPTION OF PROCEDURE:

The patient being fully dilated, she was encouraged to push.  With good pushing

effort, she delivered the head, subsequently by the anterior and posterior

shoulders.  Body of the infant was delivered.  Infant was placed on maternal

abdomen.  Delayed cord clamping was observed.  Cord blood gases were obtained.

Placenta was delivered via controlled cord traction.  Perineum was inspected,

noted to be intact.  The patient was left in Labor and Delivery room in stable

condition.  Placenta had a three-vessel cord.  There was cord around the neck,

that was reduced.

 

 

LUIS CARLOS BALL

DD:  10/29/2020 12:04:10

DT:  10/29/2020 13:30:53

Job #:  [id number]

IJEOMA

## 2020-10-30 NOTE — PCM.PNPP
- General Info


Date of Service: 10/30/20


Functional Status: Reports: Pain Controlled, Tolerating Diet, Ambulating, 

Urinating





- Review of Systems


General: Reports: No Symptoms


HEENT: Reports: No Symptoms


Pulmonary: Reports: No Symptoms


Cardiovascular: Reports: No Symptoms


Gastrointestinal: Reports: No Symptoms


Genitourinary: Reports: No Symptoms


Musculoskeletal: Reports: No Symptoms


Skin: Reports: No Symptoms


Neurological: Reports: No Symptoms


Psychiatric: Reports: No Symptoms





- General Info


Date of Service: 10/30/20





- Patient Data


Vital Signs - Most Recent: 


                                Last Vital Signs











Temp  36.4 C   10/30/20 07:36


 


Pulse  81   10/30/20 07:36


 


Resp  14   10/30/20 07:36


 


BP  115/67   10/30/20 07:36


 


Pulse Ox  100   10/30/20 07:36











Weight - Most Recent: 63.503 kg


Lab Results - Last 24 Hours: 


                         Laboratory Results - last 24 hr











  10/29/20 10/30/20 Range/Units





  11:22 04:40 


 


Hgb   11.5 L  (12.0-16.0)  g/dL


 


Hct   35.1 L  (36.0-46.0)  %


 


Cord ABG pH  7.191   (7.18-7.38)  


 


Cord ABG Base Excess  -7   (-10--2)  


 


Cord VBG pH  7.387   (7.25-7.45)  


 


Cord VBG Base Excess  -3   (-10--2)  











Med Orders - Current: 


                               Current Medications





Acetaminophen (Tylenol Extra Strength)  500 mg PO Q4H PRN


   PRN Reason: Pain


Acetaminophen (Tylenol Extra Strength)  1,000 mg PO Q4H PRN


   PRN Reason: Pain


Benzocaine/Menthol (Dermoplast Pain Relief 20%-0.5% Spray)  78 gm TOP ASDIRECTED

PRN


   PRN Reason: Perineal Comfort Measure


Bisacodyl (Dulcolax)  10 mg RECTAL ONETIME PRN


   PRN Reason: Constipation


Butorphanol Tartrate (Stadol)  1 mg IVPUSH Q1H PRN


   PRN Reason: Pain


Carboprost Tromethamine (Hemabate Ds)  250 mcg IM ASDIRECTED PRN


   PRN Reason: Post Partum Hemorrhage


Docusate Sodium (Colace)  100 mg PO BID PRN


   PRN Reason: Constipation


Emollient Ointment (Lansinoh Hpa)  0 gm TOP ASDIRECTED PRN


   PRN Reason: Sore Nipples


   Last Admin: 10/30/20 08:17 Dose:  7 gram


   Documented by: 


Oxytocin/Sodium Chloride (Oxytocin 30 Unit/500 Ml-Ns)  30 unit in 500 mls @ 999 

mls/hr IV TITRATE [facility]


   Last Admin: 10/29/20 11:28 Dose:  999 mls/hr


   Documented by: 


Tranexamic Acid 1,000 mg/ (Sodium Chloride)  110 mls @ 660 mls/hr IV ONETIME PRN


   PRN Reason: Bleeding


Lactated Ringer's (Ringers, Lactated)  1,000 mls @ 150 mls/hr IV ASDIRECTED [facility]


   Last Infusion: 10/29/20 08:08 Dose:  150 mls/hr


   Documented by: 


Ibuprofen (Motrin)  400 mg PO Q4H PRN


   PRN Reason: Pain


Ibuprofen (Motrin)  800 mg PO Q6H PRN


   PRN Reason: Pain


Lidocaine HCl (Xylocaine 1%)  50 ml INJECT ONETIME PRN


   PRN Reason: Laceration repair


Methylergonovine Maleate (Methergine)  0.2 mg IM ASDIRECTED PRN


   PRN Reason: Post Partum Hemorrhage


Misoprostol (Cytotec)  200 mcg PO ONETIME PRN


   PRN Reason: Post Partum Hemorrhage


Nalbuphine HCl (Nubain)  10 mg IVPUSH Q1H PRN


   PRN Reason: Pain (severe 7-10)


Oxycodone HCl (Oxycodone)  5 mg PO Q2H PRN


   PRN Reason: Pain


Sodium Chloride (Saline Flush)  10 ml FLUSH ASDIRECTED PRN


   PRN Reason: Keep Vein Open


Sodium Chloride (Saline Flush)  2.5 ml FLUSH ASDIRECTED PRN


   PRN Reason: Keep Vein Open


Sodium Chloride (Normal Saline)  10 ml IV ASDIRECTED PRN


   PRN Reason: IV Use


Sterile Water (Sterile Water For Irrigation)  1,000 ml IRR ASDIRECTED PRN


   PRN Reason: delivery


Witch Hazel (Tucks)  1 pad TOP ASDIRECTED PRN


   PRN Reason: comfort care





Discontinued Medications





Ropivacaine (Naropin 0.2%) Confirm Administered Dose 100 mls @ as directed 

.ROUTE .STK-MED ONE


   Stop: 10/29/20 07:53


   Last Admin: 10/29/20 11:03 Dose:  Not Given


   Documented by: 











- Infant Interaction


Support Person: 





- Postpartum Recovery Exam


Fundal Tone: Firm


Fundal Level: 2 Fingerbreadths Below Umbilicus


Fundal Placement: Midline


Lochia Amount: Scant


Lochia Color: Rubra/Red


Perineum Description: Intact, Minimal Bruising/Swelling


Bladder Status: Voiding


Urinary Elimination: Voided





- Exam


General: Alert


HEENT: Pupils Equal


Neck: Supple


Lungs: Clear to Auscultation


Cardiovascular: Regular Rate, Regular Rhythm


GI/Abdominal Exam: Normal Bowel Sounds


Extremities: Normal Inspection





- Problem List & Annotations


(1) Vaginal delivery


SNOMED Code(s): 311529470


   Code(s): O80 - ENCOUNTER FOR FULL-TERM UNCOMPLICATED DELIVERY   Status: Acute

  Current Visit: No   





- Problem List Review


Problem List Initiated/Reviewed/Updated: Yes





- My Orders


Last 24 Hours: 


My Active Orders





10/29/20 Lunch


Regular Diet [DIET] 





10/29/20 11:58


Acetaminophen [Tylenol Extra Strength]   1,000 mg PO Q4H PRN 


Acetaminophen [Tylenol Extra Strength]   500 mg PO Q4H PRN 


Benzocaine/Menthol [Dermoplast Pain Relief 20%-0.5% Spray]   78 gm TOP 

ASDIRECTED PRN 


Docusate Sodium [Colace]   100 mg PO BID PRN 


Ibuprofen [Motrin]   400 mg PO Q4H PRN 


Ibuprofen [Motrin]   800 mg PO Q6H PRN 


Lanolin [Lansinoh HPA]   See Dose Instructions  TOP ASDIRECTED PRN 


bisacodyL [Dulcolax]   10 mg RECTAL ONETIME PRN 


oxyCODONE   5 mg PO Q2H PRN 


witch hazeL [Tucks]   1 pad TOP ASDIRECTED PRN 


Resuscitation Status Routine 





10/29/20 11:59


Patient Status [ADT] Routine 


May Shower [RC] ASDIRECTED 


Up ad Avani [RC] ASDIRECTED 


Vital Signs [RC] PER UNIT ROUTINE 


Assess Lochia [WOMSER] Per Unit Routine 


Assess Uterine Involution [WOMSER] Per Unit Routine 


Peripheral IV Discontinue [OM.PC] Routine 














- Assessment


Assessment:: 





34yo P3 s/p  PPD1 , ambulating , breastfeeding and tolerating regular diet 





- Plan


Plan:: 


Routine postpartum


Discharge home

## 2020-10-30 NOTE — [id number]
Post Anesthesia Note





- EVALUATION WITHIN 48HRS OF ANESTHETIC


Vital Signs in Normal Range: Yes


Patient Participated in Evaluation: Yes


Respiratory Function Stable: Yes


Airway Patent: Yes


Cardiovascular Function Stable: Yes


Hydration Status Stable: Yes


Pain Control Satisfactory: Yes


Nausea and Vomiting Control Satisfactory: Yes


Mental Status Recovered: Yes


Vital Signs: 


                                Last Vital Signs











Temp  36.4 C   10/30/20 07:36


 


Pulse  81   10/30/20 07:36


 


Resp  14   10/30/20 07:36


 


BP  115/67   10/30/20 07:36


 


Pulse Ox  100   10/30/20 07:36














- COMMENTS/OBSERVATIONS


Free Text/Narrative:: 





Sitting up in bed.  Denies any complaints except a sore back.